# Patient Record
Sex: FEMALE | Race: WHITE | NOT HISPANIC OR LATINO
[De-identification: names, ages, dates, MRNs, and addresses within clinical notes are randomized per-mention and may not be internally consistent; named-entity substitution may affect disease eponyms.]

---

## 2017-08-22 ENCOUNTER — RX RENEWAL (OUTPATIENT)
Age: 22
End: 2017-08-22

## 2017-09-22 ENCOUNTER — RX RENEWAL (OUTPATIENT)
Age: 22
End: 2017-09-22

## 2017-10-20 ENCOUNTER — RX RENEWAL (OUTPATIENT)
Age: 22
End: 2017-10-20

## 2017-11-17 ENCOUNTER — RX RENEWAL (OUTPATIENT)
Age: 22
End: 2017-11-17

## 2017-12-01 ENCOUNTER — APPOINTMENT (OUTPATIENT)
Dept: NEUROLOGY | Facility: CLINIC | Age: 22
End: 2017-12-01
Payer: COMMERCIAL

## 2017-12-01 VITALS
HEIGHT: 63 IN | BODY MASS INDEX: 19.84 KG/M2 | WEIGHT: 112 LBS | DIASTOLIC BLOOD PRESSURE: 80 MMHG | SYSTOLIC BLOOD PRESSURE: 116 MMHG

## 2017-12-01 PROCEDURE — 99214 OFFICE O/P EST MOD 30 MIN: CPT

## 2017-12-01 RX ORDER — FLUTICASONE PROPIONATE 50 UG/1
50 SPRAY, METERED NASAL
Qty: 16 | Refills: 0 | Status: ACTIVE | COMMUNITY
Start: 2017-08-07

## 2017-12-01 RX ORDER — AMOXICILLIN AND CLAVULANATE POTASSIUM 875; 125 MG/1; MG/1
875-125 TABLET, COATED ORAL
Qty: 20 | Refills: 0 | Status: ACTIVE | COMMUNITY
Start: 2017-06-16

## 2017-12-01 RX ORDER — PREDNISONE 50 MG/1
50 TABLET ORAL
Qty: 5 | Refills: 0 | Status: ACTIVE | COMMUNITY
Start: 2017-11-14

## 2017-12-01 RX ORDER — ESCITALOPRAM OXALATE 10 MG/1
10 TABLET ORAL
Qty: 30 | Refills: 0 | Status: ACTIVE | COMMUNITY
Start: 2017-10-23

## 2017-12-01 RX ORDER — AZELASTINE HYDROCHLORIDE 137 UG/1
0.1 SPRAY, METERED NASAL
Qty: 30 | Refills: 0 | Status: ACTIVE | COMMUNITY
Start: 2017-08-07

## 2017-12-05 ENCOUNTER — RX RENEWAL (OUTPATIENT)
Age: 22
End: 2017-12-05

## 2018-01-05 ENCOUNTER — RX RENEWAL (OUTPATIENT)
Age: 23
End: 2018-01-05

## 2018-02-01 ENCOUNTER — RX RENEWAL (OUTPATIENT)
Age: 23
End: 2018-02-01

## 2018-02-27 ENCOUNTER — RX RENEWAL (OUTPATIENT)
Age: 23
End: 2018-02-27

## 2018-03-26 ENCOUNTER — RX RENEWAL (OUTPATIENT)
Age: 23
End: 2018-03-26

## 2018-03-27 ENCOUNTER — RX RENEWAL (OUTPATIENT)
Age: 23
End: 2018-03-27

## 2018-04-24 ENCOUNTER — RX RENEWAL (OUTPATIENT)
Age: 23
End: 2018-04-24

## 2018-05-16 ENCOUNTER — RX RENEWAL (OUTPATIENT)
Age: 23
End: 2018-05-16

## 2018-06-01 ENCOUNTER — APPOINTMENT (OUTPATIENT)
Dept: NEUROLOGY | Facility: CLINIC | Age: 23
End: 2018-06-01
Payer: COMMERCIAL

## 2018-06-01 VITALS
HEIGHT: 63 IN | DIASTOLIC BLOOD PRESSURE: 78 MMHG | WEIGHT: 110 LBS | BODY MASS INDEX: 19.49 KG/M2 | SYSTOLIC BLOOD PRESSURE: 130 MMHG

## 2018-06-01 PROCEDURE — 99214 OFFICE O/P EST MOD 30 MIN: CPT

## 2018-06-01 RX ORDER — ESCITALOPRAM OXALATE 5 MG/1
5 TABLET ORAL
Qty: 30 | Refills: 0 | Status: ACTIVE | COMMUNITY
Start: 2018-03-26

## 2018-06-20 ENCOUNTER — RX RENEWAL (OUTPATIENT)
Age: 23
End: 2018-06-20

## 2018-07-18 ENCOUNTER — MEDICATION RENEWAL (OUTPATIENT)
Age: 23
End: 2018-07-18

## 2018-08-15 ENCOUNTER — RX RENEWAL (OUTPATIENT)
Age: 23
End: 2018-08-15

## 2018-09-10 ENCOUNTER — MEDICATION RENEWAL (OUTPATIENT)
Age: 23
End: 2018-09-10

## 2018-09-10 RX ORDER — DEXTROAMPHETAMINE SACCHARATE, AMPHETAMINE ASPARTATE, DEXTROAMPHETAMINE SULFATE AND AMPHETAMINE SULFATE 2.5; 2.5; 2.5; 2.5 MG/1; MG/1; MG/1; MG/1
10 TABLET ORAL 3 TIMES DAILY
Qty: 90 | Refills: 0 | Status: COMPLETED | COMMUNITY
Start: 2017-08-22 | End: 2018-09-10

## 2018-09-10 RX ORDER — DEXTROAMPHETAMINE SACCHARATE, AMPHETAMINE ASPARTATE, DEXTROAMPHETAMINE SULFATE AND AMPHETAMINE SULFATE 2.5; 2.5; 2.5; 2.5 MG/1; MG/1; MG/1; MG/1
10 TABLET ORAL 3 TIMES DAILY
Qty: 90 | Refills: 0 | Status: COMPLETED | COMMUNITY
Start: 2017-09-22 | End: 2018-09-10

## 2018-09-10 RX ORDER — DEXTROAMPHETAMINE SACCHARATE, AMPHETAMINE ASPARTATE, DEXTROAMPHETAMINE SULFATE AND AMPHETAMINE SULFATE 2.5; 2.5; 2.5; 2.5 MG/1; MG/1; MG/1; MG/1
10 TABLET ORAL
Qty: 90 | Refills: 0 | Status: COMPLETED | COMMUNITY
Start: 2017-10-20 | End: 2018-09-10

## 2018-09-10 RX ORDER — DEXTROAMPHETAMINE SACCHARATE, AMPHETAMINE ASPARTATE, DEXTROAMPHETAMINE SULFATE AND AMPHETAMINE SULFATE 5; 5; 5; 5 MG/1; MG/1; MG/1; MG/1
20 TABLET ORAL 3 TIMES DAILY
Qty: 90 | Refills: 0 | Status: COMPLETED | COMMUNITY
Start: 2017-12-05 | End: 2018-09-10

## 2018-09-24 ENCOUNTER — RX RENEWAL (OUTPATIENT)
Age: 23
End: 2018-09-24

## 2018-10-19 ENCOUNTER — RX RENEWAL (OUTPATIENT)
Age: 23
End: 2018-10-19

## 2018-11-15 ENCOUNTER — RX RENEWAL (OUTPATIENT)
Age: 23
End: 2018-11-15

## 2018-12-13 ENCOUNTER — RX RENEWAL (OUTPATIENT)
Age: 23
End: 2018-12-13

## 2018-12-19 ENCOUNTER — APPOINTMENT (OUTPATIENT)
Dept: NEUROLOGY | Facility: CLINIC | Age: 23
End: 2018-12-19

## 2019-01-10 ENCOUNTER — RX RENEWAL (OUTPATIENT)
Age: 24
End: 2019-01-10

## 2019-02-04 ENCOUNTER — RX RENEWAL (OUTPATIENT)
Age: 24
End: 2019-02-04

## 2019-03-04 ENCOUNTER — RX RENEWAL (OUTPATIENT)
Age: 24
End: 2019-03-04

## 2019-03-05 ENCOUNTER — RX RENEWAL (OUTPATIENT)
Age: 24
End: 2019-03-05

## 2019-03-28 ENCOUNTER — APPOINTMENT (OUTPATIENT)
Dept: NEUROLOGY | Facility: CLINIC | Age: 24
End: 2019-03-28
Payer: MEDICAID

## 2019-03-28 VITALS
DIASTOLIC BLOOD PRESSURE: 70 MMHG | HEIGHT: 63 IN | SYSTOLIC BLOOD PRESSURE: 120 MMHG | BODY MASS INDEX: 19.84 KG/M2 | WEIGHT: 112 LBS

## 2019-03-28 PROCEDURE — 99214 OFFICE O/P EST MOD 30 MIN: CPT

## 2019-03-28 NOTE — DISCUSSION/SUMMARY
[FreeTextEntry1] : Attention deficit disorder. We had a lengthy discussion. She will remain on Adderall 30 mg twice a day. Followup in 6 months and by phone prior to that should the need arise.\par \par Anxiety. On Lexapro. Follows with psychiatry.

## 2019-03-28 NOTE — HISTORY OF PRESENT ILLNESS
[FreeTextEntry1] : I have been treating her for several years for attention deficit disorder. She is currently on Adderall 30 mg 2 times a day.  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 She has seen a psychiatrist for that problem.

## 2019-03-28 NOTE — PHYSICAL EXAM
[General Appearance - Alert] : alert [General Appearance - In No Acute Distress] : in no acute distress [General Appearance - Well-Appearing] : healthy appearing [Oriented To Time, Place, And Person] : oriented to person, place, and time [Impaired Insight] : insight and judgment were intact [Affect] : the affect was normal [Memory Recent] : recent memory was not impaired [Person] : oriented to person [Place] : oriented to place [Time] : oriented to time [Concentration Intact] : normal concentrating ability [Fluency] : fluency intact [Comprehension] : comprehension intact [Cranial Nerves Optic (II)] : visual acuity intact bilaterally,  visual fields full to confrontation, pupils equal round and reactive to light [Cranial Nerves Oculomotor (III)] : extraocular motion intact [Cranial Nerves Trigeminal (V)] : facial sensation intact symmetrically [Cranial Nerves Facial (VII)] : face symmetrical [Cranial Nerves Vestibulocochlear (VIII)] : hearing was intact bilaterally [Motor Tone] : muscle tone was normal in all four extremities [Motor Strength] : muscle strength was normal in all four extremities [No Muscle Atrophy] : normal bulk in all four extremities [Sensation Tactile Decrease] : light touch was intact [Abnormal Walk] : normal gait [Balance] : balance was intact [2+] : Ankle jerk left 2+ [Romberg's Sign] : Romberg's sign was negtive [Past-pointing] : there was no past-pointing [Tremor] : no tremor present [Coordination - Dysmetria Impaired Finger-to-Nose Bilateral] : not present [Coordination - Dysmetria Impaired Heel-to-Shin Bilateral] : not present [Plantar Reflex Right Only] : normal on the right [Plantar Reflex Left Only] : normal on the left [Edema] : there was no peripheral edema

## 2019-04-02 ENCOUNTER — RX RENEWAL (OUTPATIENT)
Age: 24
End: 2019-04-02

## 2019-04-30 ENCOUNTER — RX RENEWAL (OUTPATIENT)
Age: 24
End: 2019-04-30

## 2019-05-28 ENCOUNTER — RESULT REVIEW (OUTPATIENT)
Age: 24
End: 2019-05-28

## 2019-06-25 ENCOUNTER — RX RENEWAL (OUTPATIENT)
Age: 24
End: 2019-06-25

## 2019-07-22 ENCOUNTER — RX RENEWAL (OUTPATIENT)
Age: 24
End: 2019-07-22

## 2019-08-14 ENCOUNTER — MEDICATION RENEWAL (OUTPATIENT)
Age: 24
End: 2019-08-14

## 2019-09-12 ENCOUNTER — RX RENEWAL (OUTPATIENT)
Age: 24
End: 2019-09-12

## 2019-10-07 ENCOUNTER — RX RENEWAL (OUTPATIENT)
Age: 24
End: 2019-10-07

## 2019-11-06 ENCOUNTER — RX CHANGE (OUTPATIENT)
Age: 24
End: 2019-11-06

## 2019-11-13 ENCOUNTER — APPOINTMENT (OUTPATIENT)
Dept: NEUROLOGY | Facility: CLINIC | Age: 24
End: 2019-11-13

## 2019-12-04 ENCOUNTER — APPOINTMENT (OUTPATIENT)
Dept: NEUROLOGY | Facility: CLINIC | Age: 24
End: 2019-12-04
Payer: COMMERCIAL

## 2019-12-04 VITALS
DIASTOLIC BLOOD PRESSURE: 70 MMHG | BODY MASS INDEX: 19.84 KG/M2 | WEIGHT: 112 LBS | HEIGHT: 63 IN | SYSTOLIC BLOOD PRESSURE: 100 MMHG

## 2019-12-04 PROCEDURE — 99214 OFFICE O/P EST MOD 30 MIN: CPT

## 2020-01-02 ENCOUNTER — RX RENEWAL (OUTPATIENT)
Age: 25
End: 2020-01-02

## 2020-06-03 ENCOUNTER — APPOINTMENT (OUTPATIENT)
Dept: NEUROLOGY | Facility: CLINIC | Age: 25
End: 2020-06-03

## 2020-06-23 ENCOUNTER — APPOINTMENT (OUTPATIENT)
Dept: NEUROLOGY | Facility: CLINIC | Age: 25
End: 2020-06-23
Payer: COMMERCIAL

## 2020-06-23 PROCEDURE — 99214 OFFICE O/P EST MOD 30 MIN: CPT | Mod: 95

## 2020-06-23 NOTE — HISTORY OF PRESENT ILLNESS
[FreeTextEntry1] : I have been treating her for several years for attention deficit disorder. She is currently on Adderall 30 mg 2 times a day.  Finished school. Looking for accounting job. Doing well.\par \par She is also on Lexapro for anxiety. She has seen a psychiatrist for that problem.

## 2021-01-07 ENCOUNTER — TRANSCRIPTION ENCOUNTER (OUTPATIENT)
Age: 26
End: 2021-01-07

## 2021-01-28 ENCOUNTER — APPOINTMENT (OUTPATIENT)
Dept: NEUROLOGY | Facility: CLINIC | Age: 26
End: 2021-01-28
Payer: COMMERCIAL

## 2021-01-28 DIAGNOSIS — U07.1 COVID-19: ICD-10-CM

## 2021-01-28 PROCEDURE — 99214 OFFICE O/P EST MOD 30 MIN: CPT | Mod: 95

## 2021-07-28 ENCOUNTER — APPOINTMENT (OUTPATIENT)
Dept: NEUROLOGY | Facility: CLINIC | Age: 26
End: 2021-07-28
Payer: COMMERCIAL

## 2021-07-28 DIAGNOSIS — F41.9 ANXIETY DISORDER, UNSPECIFIED: ICD-10-CM

## 2021-07-28 DIAGNOSIS — F90.9 ATTENTION-DEFICIT HYPERACTIVITY DISORDER, UNSPECIFIED TYPE: ICD-10-CM

## 2021-07-28 PROCEDURE — 99213 OFFICE O/P EST LOW 20 MIN: CPT | Mod: 95

## 2021-09-28 ENCOUNTER — TRANSCRIPTION ENCOUNTER (OUTPATIENT)
Age: 26
End: 2021-09-28

## 2021-11-23 ENCOUNTER — TRANSCRIPTION ENCOUNTER (OUTPATIENT)
Age: 26
End: 2021-11-23

## 2022-02-15 ENCOUNTER — NON-APPOINTMENT (OUTPATIENT)
Age: 27
End: 2022-02-15

## 2022-02-28 RX ORDER — DEXTROAMPHETAMINE SACCHARATE, AMPHETAMINE ASPARTATE, DEXTROAMPHETAMINE SULFATE AND AMPHETAMINE SULFATE 7.5; 7.5; 7.5; 7.5 MG/1; MG/1; MG/1; MG/1
30 TABLET ORAL
Qty: 60 | Refills: 0 | Status: ACTIVE | COMMUNITY
Start: 2018-03-27 | End: 1900-01-01

## 2022-03-28 ENCOUNTER — APPOINTMENT (OUTPATIENT)
Dept: NEUROLOGY | Facility: CLINIC | Age: 27
End: 2022-03-28

## 2023-02-24 ENCOUNTER — EMERGENCY (EMERGENCY)
Facility: HOSPITAL | Age: 28
LOS: 1 days | Discharge: HOME | End: 2023-02-24
Attending: EMERGENCY MEDICINE
Payer: COMMERCIAL

## 2023-02-24 VITALS
HEART RATE: 117 BPM | WEIGHT: 119.93 LBS | RESPIRATION RATE: 18 BRPM | SYSTOLIC BLOOD PRESSURE: 117 MMHG | OXYGEN SATURATION: 100 % | TEMPERATURE: 98 F | DIASTOLIC BLOOD PRESSURE: 74 MMHG | HEIGHT: 63 IN

## 2023-02-24 PROCEDURE — 99284 EMERGENCY DEPT VISIT MOD MDM: CPT

## 2023-02-24 RX ORDER — ACETAMINOPHEN 500 MG
3 TABLET ORAL
Qty: 120 | Refills: 0
Start: 2023-02-24 | End: 2023-03-05

## 2023-02-24 RX ORDER — METHOCARBAMOL 500 MG/1
2 TABLET, FILM COATED ORAL
Qty: 18 | Refills: 0
Start: 2023-02-24 | End: 2023-02-26

## 2023-02-24 RX ORDER — ACETAMINOPHEN 500 MG
650 TABLET ORAL ONCE
Refills: 0 | Status: COMPLETED | OUTPATIENT
Start: 2023-02-24 | End: 2023-02-24

## 2023-02-24 RX ORDER — IBUPROFEN 200 MG
1 TABLET ORAL
Qty: 20 | Refills: 0
Start: 2023-02-24 | End: 2023-02-28

## 2023-02-24 RX ORDER — OXYCODONE AND ACETAMINOPHEN 5; 325 MG/1; MG/1
1 TABLET ORAL
Qty: 9 | Refills: 0
Start: 2023-02-24 | End: 2023-02-26

## 2023-02-24 RX ADMIN — Medication 650 MILLIGRAM(S): at 13:03

## 2023-02-24 NOTE — ED PROVIDER NOTE - CLINICAL SUMMARY MEDICAL DECISION MAKING FREE TEXT BOX
28 yo female hx of herniated lumbar disc presenting to the ED with lower back pain with radiculopathy, vitals stable nontoxic appearing without focal neurological deficits on exam, negative red flags. advised on outpt fu with SPINE, new pain medication prescribed. will dc with fu with spine and pain management

## 2023-02-24 NOTE — ED PROVIDER NOTE - PHYSICAL EXAMINATION
Gen: Well appearing in NAD  Head: NC/AT  Neck: trachea midline  Resp:  No distress CTA   CARD RR   ABD soft no suprapubic tenderness   SPINE: + right lumbar paraspinal muscle tenderness. no midline tenderness. no step offs. no visualized erythema. sensation to touch intact to bilateral lower extremities. standing and sitting independently. ambulatory stable gait.   Ext: no deformities  Neuro:  A&O appears non focal  Skin:  Warm and dry as visualized  Psych:  Normal affect and mood

## 2023-02-24 NOTE — ED PROVIDER NOTE - PROGRESS NOTE DETAILS
advised to continue medrol dose nina till completed. advised on sending new muscle relaxer to pharmacy along with break through pain medication, referred to spine and pain management. advised on return precautions

## 2023-02-24 NOTE — ED ADULT NURSE NOTE - CHIEF COMPLAINT QUOTE
pt c/o right lower back pain that radiates to right leg that worsened since Friday, hx herniated disks and back pain

## 2023-02-24 NOTE — ED PROVIDER NOTE - NSFOLLOWUPINSTRUCTIONS_ED_ALL_ED_FT
please follow with SPINE and PAIN MANAGEMENT   please stop flexeril and start robaxin   please continue ibuprofen every 6-8 hours with food  please take ibuprofen 650mg every 6 hours   you were prescribed Percocet for breakthrough pain control ONLY   please finish course of steroids and continue lidocaine patches   new or worsening symptoms return to the ED     Back Pain    Back pain is very common in adults. The cause of back pain is rarely dangerous and the pain often gets better over time. The cause of your back pain may not be known and may include strain of muscles or ligaments, degeneration of the spinal disks, or arthritis. Occasionally the pain may radiate down your leg(s). Over-the-counter medicines to reduce pain and inflammation are often the most helpful. Stretching and remaining active frequently helps the healing process.     SEEK IMMEDIATE MEDICAL CARE IF YOU HAVE ANY OF THE FOLLOWING SYMPTOMS: bowel or bladder control problems, unusual weakness or numbness in your arms or legs, nausea or vomiting, abdominal pain, fever, dizziness/lightheadedness.

## 2023-02-24 NOTE — ED ADULT NURSE NOTE - OBJECTIVE STATEMENT
Pt received in supertrack A&Ox4 c/o L lower back pain radiating to RLE. Hx of herniated discs. Referred by PCP to see spinal cs with MRI. Pt ambulatory with steady gait stating she typically states she takes motrin. Respirations even & unlabored. NAD. Pt made aware of plan of care and verbalized understanding.

## 2023-02-24 NOTE — ED PROVIDER NOTE - CARE PROVIDER_API CALL
Demarcus Madera)  Neurosurgery  [address], [address]  [place] [address]  Phone: [phone]  Fax: [phone]  Follow Up Time: Urgent    Lawrence Go)  Anesthesiology; Pain Medicine  [address], [place] [address]  Phone: [phone]  Fax: [phone]  Follow Up Time: Urgent

## 2023-02-24 NOTE — ED PROVIDER NOTE - PROVIDER TOKENS
PROVIDER:[TOKEN:[[id number]],FOLLOWUP:[Urgent]],PROVIDER:[TOKEN:[[id number]],FOLLOWUP:[Urgent]]

## 2023-02-24 NOTE — ED PROVIDER NOTE - OBJECTIVE STATEMENT
26 yo female hx of lower herniated disc 2/2 to scoliosis presenting to the ED with atraumatic lower back pain with radiculopathy down the right leg, no associated bladder or bowel incontinence saddle anesthesia, dysuria hematuria or changes in bowel movements. seen by UC prescribed flexeril 10mg as well as lidocaine patches and medrol dose nina that she has taken over the last two days without significant relief, along with ibu that she had left over in the house. has not followed in the community with either pain management or with spine.   lmp beginning of February, denies chance of pregnancy

## 2023-02-24 NOTE — ED PROVIDER NOTE - PATIENT PORTAL LINK FT
You can access the FollowMyHealth Patient Portal offered by [facility] by registering at the following website: [email or web address]. By joining [facility]’s FollowMyHealth portal, you will also be able to view your health information using other applications (apps) compatible with our system.

## 2023-02-24 NOTE — ED ADULT TRIAGE NOTE - CHIEF COMPLAINT QUOTE
pt c/o right lower back pain that radiates to right leg that worsened since Friday pt c/o right lower back pain that radiates to right leg that worsened since Friday, hx herniated disks and back pain

## 2023-03-10 ENCOUNTER — TRANSCRIPTION ENCOUNTER (OUTPATIENT)
Age: 28
End: 2023-03-10

## 2023-04-13 ENCOUNTER — NON-APPOINTMENT (OUTPATIENT)
Age: 28
End: 2023-04-13